# Patient Record
Sex: MALE | Race: OTHER | HISPANIC OR LATINO | URBAN - METROPOLITAN AREA
[De-identification: names, ages, dates, MRNs, and addresses within clinical notes are randomized per-mention and may not be internally consistent; named-entity substitution may affect disease eponyms.]

---

## 2018-06-11 ENCOUNTER — INPATIENT (INPATIENT)
Facility: HOSPITAL | Age: 33
LOS: 0 days | Discharge: HOME | End: 2018-06-12
Attending: SURGERY | Admitting: SURGERY
Payer: OTHER MISCELLANEOUS

## 2018-06-11 VITALS
OXYGEN SATURATION: 96 % | RESPIRATION RATE: 16 BRPM | TEMPERATURE: 98 F | WEIGHT: 265 LBS | DIASTOLIC BLOOD PRESSURE: 95 MMHG | SYSTOLIC BLOOD PRESSURE: 140 MMHG | HEART RATE: 76 BPM

## 2018-06-11 DIAGNOSIS — Z90.49 ACQUIRED ABSENCE OF OTHER SPECIFIED PARTS OF DIGESTIVE TRACT: Chronic | ICD-10-CM

## 2018-06-11 LAB
ALBUMIN SERPL ELPH-MCNC: 3.8 G/DL — SIGNIFICANT CHANGE UP (ref 3.3–5)
ALP SERPL-CCNC: 57 U/L — SIGNIFICANT CHANGE UP (ref 40–120)
ALT FLD-CCNC: 33 U/L — SIGNIFICANT CHANGE UP (ref 12–78)
ANION GAP SERPL CALC-SCNC: 4 MMOL/L — LOW (ref 5–17)
APTT BLD: 29.2 SEC — SIGNIFICANT CHANGE UP (ref 27.5–37.4)
AST SERPL-CCNC: 27 U/L — SIGNIFICANT CHANGE UP (ref 15–37)
BASOPHILS # BLD AUTO: 0.09 K/UL — SIGNIFICANT CHANGE UP (ref 0–0.2)
BASOPHILS NFR BLD AUTO: 0.8 % — SIGNIFICANT CHANGE UP (ref 0–2)
BILIRUB SERPL-MCNC: 0.4 MG/DL — SIGNIFICANT CHANGE UP (ref 0.2–1.2)
BLD GP AB SCN SERPL QL: SIGNIFICANT CHANGE UP
BUN SERPL-MCNC: 14 MG/DL — SIGNIFICANT CHANGE UP (ref 7–23)
CALCIUM SERPL-MCNC: 8.5 MG/DL — SIGNIFICANT CHANGE UP (ref 8.5–10.1)
CHLORIDE SERPL-SCNC: 107 MMOL/L — SIGNIFICANT CHANGE UP (ref 96–108)
CK SERPL-CCNC: 318 U/L — HIGH (ref 26–308)
CO2 SERPL-SCNC: 28 MMOL/L — SIGNIFICANT CHANGE UP (ref 22–31)
CREAT SERPL-MCNC: 1.12 MG/DL — SIGNIFICANT CHANGE UP (ref 0.5–1.3)
EOSINOPHIL # BLD AUTO: 0.3 K/UL — SIGNIFICANT CHANGE UP (ref 0–0.5)
EOSINOPHIL NFR BLD AUTO: 2.8 % — SIGNIFICANT CHANGE UP (ref 0–6)
GLUCOSE SERPL-MCNC: 89 MG/DL — SIGNIFICANT CHANGE UP (ref 70–99)
HCT VFR BLD CALC: 44.5 % — SIGNIFICANT CHANGE UP (ref 39–50)
HGB BLD-MCNC: 15.1 G/DL — SIGNIFICANT CHANGE UP (ref 13–17)
IMM GRANULOCYTES NFR BLD AUTO: 0.7 % — SIGNIFICANT CHANGE UP (ref 0–1.5)
INR BLD: 1.12 RATIO — SIGNIFICANT CHANGE UP (ref 0.88–1.16)
LACTATE SERPL-SCNC: 1.5 MMOL/L — SIGNIFICANT CHANGE UP (ref 0.7–2)
LYMPHOCYTES # BLD AUTO: 2.5 K/UL — SIGNIFICANT CHANGE UP (ref 1–3.3)
LYMPHOCYTES # BLD AUTO: 23.1 % — SIGNIFICANT CHANGE UP (ref 13–44)
MCHC RBC-ENTMCNC: 28.5 PG — SIGNIFICANT CHANGE UP (ref 27–34)
MCHC RBC-ENTMCNC: 33.9 GM/DL — SIGNIFICANT CHANGE UP (ref 32–36)
MCV RBC AUTO: 84.1 FL — SIGNIFICANT CHANGE UP (ref 80–100)
MONOCYTES # BLD AUTO: 0.9 K/UL — SIGNIFICANT CHANGE UP (ref 0–0.9)
MONOCYTES NFR BLD AUTO: 8.3 % — SIGNIFICANT CHANGE UP (ref 2–14)
NEUTROPHILS # BLD AUTO: 6.93 K/UL — SIGNIFICANT CHANGE UP (ref 1.8–7.4)
NEUTROPHILS NFR BLD AUTO: 64.3 % — SIGNIFICANT CHANGE UP (ref 43–77)
NRBC # BLD: 0 /100 WBCS — SIGNIFICANT CHANGE UP (ref 0–0)
PLATELET # BLD AUTO: 230 K/UL — SIGNIFICANT CHANGE UP (ref 150–400)
POTASSIUM SERPL-MCNC: 3.8 MMOL/L — SIGNIFICANT CHANGE UP (ref 3.5–5.3)
POTASSIUM SERPL-SCNC: 3.8 MMOL/L — SIGNIFICANT CHANGE UP (ref 3.5–5.3)
PROT SERPL-MCNC: 7.4 GM/DL — SIGNIFICANT CHANGE UP (ref 6–8.3)
PROTHROM AB SERPL-ACNC: 12.1 SEC — SIGNIFICANT CHANGE UP (ref 9.8–12.7)
RBC # BLD: 5.29 M/UL — SIGNIFICANT CHANGE UP (ref 4.2–5.8)
RBC # FLD: 13.1 % — SIGNIFICANT CHANGE UP (ref 10.3–14.5)
SODIUM SERPL-SCNC: 139 MMOL/L — SIGNIFICANT CHANGE UP (ref 135–145)
TYPE + AB SCN PNL BLD: SIGNIFICANT CHANGE UP
WBC # BLD: 10.8 K/UL — HIGH (ref 3.8–10.5)
WBC # FLD AUTO: 10.8 K/UL — HIGH (ref 3.8–10.5)

## 2018-06-11 PROCEDURE — 73120 X-RAY EXAM OF HAND: CPT | Mod: 26,RT

## 2018-06-11 PROCEDURE — 71045 X-RAY EXAM CHEST 1 VIEW: CPT | Mod: 26

## 2018-06-11 PROCEDURE — 76377 3D RENDER W/INTRP POSTPROCES: CPT | Mod: 26

## 2018-06-11 PROCEDURE — 73090 X-RAY EXAM OF FOREARM: CPT | Mod: 26,RT

## 2018-06-11 PROCEDURE — 73080 X-RAY EXAM OF ELBOW: CPT | Mod: 26,LT

## 2018-06-11 PROCEDURE — 93010 ELECTROCARDIOGRAM REPORT: CPT

## 2018-06-11 PROCEDURE — 73110 X-RAY EXAM OF WRIST: CPT | Mod: 26,RT

## 2018-06-11 PROCEDURE — 74177 CT ABD & PELVIS W/CONTRAST: CPT | Mod: 26

## 2018-06-11 PROCEDURE — 73080 X-RAY EXAM OF ELBOW: CPT | Mod: 26,77,RT

## 2018-06-11 PROCEDURE — 72125 CT NECK SPINE W/O DYE: CPT | Mod: 26

## 2018-06-11 PROCEDURE — 99285 EMERGENCY DEPT VISIT HI MDM: CPT

## 2018-06-11 PROCEDURE — 70450 CT HEAD/BRAIN W/O DYE: CPT | Mod: 26

## 2018-06-11 PROCEDURE — 71260 CT THORAX DX C+: CPT | Mod: 26

## 2018-06-11 PROCEDURE — 73100 X-RAY EXAM OF WRIST: CPT | Mod: 26

## 2018-06-11 RX ORDER — HEPARIN SODIUM 5000 [USP'U]/ML
5000 INJECTION INTRAVENOUS; SUBCUTANEOUS EVERY 8 HOURS
Refills: 0 | Status: DISCONTINUED | OUTPATIENT
Start: 2018-06-11 | End: 2018-06-12

## 2018-06-11 RX ORDER — HYDROMORPHONE HYDROCHLORIDE 2 MG/ML
1 INJECTION INTRAMUSCULAR; INTRAVENOUS; SUBCUTANEOUS ONCE
Refills: 0 | Status: DISCONTINUED | OUTPATIENT
Start: 2018-06-11 | End: 2018-06-11

## 2018-06-11 RX ORDER — FAMOTIDINE 10 MG/ML
20 INJECTION INTRAVENOUS ONCE
Refills: 0 | Status: COMPLETED | OUTPATIENT
Start: 2018-06-11 | End: 2018-06-11

## 2018-06-11 RX ORDER — MORPHINE SULFATE 50 MG/1
4 CAPSULE, EXTENDED RELEASE ORAL
Refills: 0 | Status: DISCONTINUED | OUTPATIENT
Start: 2018-06-11 | End: 2018-06-12

## 2018-06-11 RX ORDER — HYDROMORPHONE HYDROCHLORIDE 2 MG/ML
1 INJECTION INTRAMUSCULAR; INTRAVENOUS; SUBCUTANEOUS EVERY 4 HOURS
Refills: 0 | Status: DISCONTINUED | OUTPATIENT
Start: 2018-06-11 | End: 2018-06-11

## 2018-06-11 RX ORDER — SODIUM CHLORIDE 9 MG/ML
1000 INJECTION INTRAMUSCULAR; INTRAVENOUS; SUBCUTANEOUS
Refills: 0 | Status: DISCONTINUED | OUTPATIENT
Start: 2018-06-11 | End: 2018-06-12

## 2018-06-11 RX ORDER — OXYCODONE HYDROCHLORIDE 5 MG/1
5 TABLET ORAL EVERY 4 HOURS
Refills: 0 | Status: DISCONTINUED | OUTPATIENT
Start: 2018-06-11 | End: 2018-06-12

## 2018-06-11 RX ORDER — ONDANSETRON 8 MG/1
4 TABLET, FILM COATED ORAL EVERY 6 HOURS
Refills: 0 | Status: DISCONTINUED | OUTPATIENT
Start: 2018-06-11 | End: 2018-06-12

## 2018-06-11 RX ORDER — SODIUM CHLORIDE 9 MG/ML
1000 INJECTION INTRAMUSCULAR; INTRAVENOUS; SUBCUTANEOUS ONCE
Refills: 0 | Status: COMPLETED | OUTPATIENT
Start: 2018-06-11 | End: 2018-06-11

## 2018-06-11 RX ORDER — CEFAZOLIN SODIUM 1 G
1000 VIAL (EA) INJECTION ONCE
Refills: 0 | Status: COMPLETED | OUTPATIENT
Start: 2018-06-11 | End: 2018-06-11

## 2018-06-11 RX ORDER — OXYCODONE HYDROCHLORIDE 5 MG/1
10 TABLET ORAL EVERY 4 HOURS
Refills: 0 | Status: DISCONTINUED | OUTPATIENT
Start: 2018-06-11 | End: 2018-06-12

## 2018-06-11 RX ADMIN — Medication 100 MILLIGRAM(S): at 13:51

## 2018-06-11 RX ADMIN — FAMOTIDINE 20 MILLIGRAM(S): 10 INJECTION INTRAVENOUS at 16:10

## 2018-06-11 RX ADMIN — MORPHINE SULFATE 4 MILLIGRAM(S): 50 CAPSULE, EXTENDED RELEASE ORAL at 22:37

## 2018-06-11 RX ADMIN — HYDROMORPHONE HYDROCHLORIDE 1 MILLIGRAM(S): 2 INJECTION INTRAMUSCULAR; INTRAVENOUS; SUBCUTANEOUS at 14:25

## 2018-06-11 RX ADMIN — HYDROMORPHONE HYDROCHLORIDE 1 MILLIGRAM(S): 2 INJECTION INTRAMUSCULAR; INTRAVENOUS; SUBCUTANEOUS at 13:51

## 2018-06-11 RX ADMIN — MORPHINE SULFATE 4 MILLIGRAM(S): 50 CAPSULE, EXTENDED RELEASE ORAL at 22:22

## 2018-06-11 RX ADMIN — SODIUM CHLORIDE 125 MILLILITER(S): 9 INJECTION INTRAMUSCULAR; INTRAVENOUS; SUBCUTANEOUS at 22:22

## 2018-06-11 RX ADMIN — SODIUM CHLORIDE 1000 MILLILITER(S): 9 INJECTION INTRAMUSCULAR; INTRAVENOUS; SUBCUTANEOUS at 13:51

## 2018-06-11 RX ADMIN — HYDROMORPHONE HYDROCHLORIDE 1 MILLIGRAM(S): 2 INJECTION INTRAMUSCULAR; INTRAVENOUS; SUBCUTANEOUS at 19:03

## 2018-06-11 NOTE — CONSULT NOTE ADULT - CONSULT REQUESTED DATE/TIME
Mom states that Edward still has congestive cough. Rescheduled pre-op appt to Monday morning because she knew that he would not be cleared for surgery and she did not want to have to reschedule again. She is hoping that a few more days of rest will help clear Edward's cough. This RN gave mom Ped's Sedation # and informed her to call and notify them if Edward is not cleared for surgery after his appt so they can reschedule his procedure.   
11-Jun-2018 17:05

## 2018-06-11 NOTE — CONSULT NOTE ADULT - SUBJECTIVE AND OBJECTIVE BOX
33y Male RHD presents c/o R wrist pain sp mechanical fall from 8-12ft while descending from ladder.  Patient landed on face, denies LOC. Denies numbness/tingling. Denies fever/chills. C/o pain in bilateral wrists/elbows and right upper extremity.  Denies pain/injury elsewhere. No other complaints.    PAST MEDICAL & SURGICAL HISTORY:  No pertinent past medical history    MEDICATIONS  (STANDING):    Allergies    No Known Allergies    Intolerances                         15.1   10.80 )-----------( 230      ( 11 Jun 2018 13:32 )             44.5     11 Jun 2018 13:32    139    |  107    |  14     ----------------------------<  89     3.8     |  28     |  1.12     Ca    8.5        11 Jun 2018 13:32    TPro  7.4    /  Alb  3.8    /  TBili  0.4    /  DBili  x      /  AST  27     /  ALT  33     /  AlkPhos  57     11 Jun 2018 13:32    PT/INR - ( 11 Jun 2018 13:32 )   PT: 12.1 sec;   INR: 1.12 ratio         PTT - ( 11 Jun 2018 13:32 )  PTT:29.2 sec  Vital Signs Last 24 Hrs  T(C): 36.4 (06-11-18 @ 13:32), Max: 36.4 (06-11-18 @ 13:32)  T(F): 97.6 (06-11-18 @ 13:32), Max: 97.6 (06-11-18 @ 13:32)  HR: 76 (06-11-18 @ 13:32) (76 - 76)  BP: 140/95 (06-11-18 @ 13:32) (140/95 - 140/95)  BP(mean): --  RR: 16 (06-11-18 @ 13:32) (16 - 16)  SpO2: 96% (06-11-18 @ 13:32) (96% - 96%)    Imaging: XR demonstrates right scaphoid fracture  CT demonstrates nasal bone fracture and zygomatic fractures    Physical Exam  Gen: NAD  R UE: Skin intact, small superficial laceration <1cm over anterior proximal forearm, no deformities, +ttp snuffbox, no other bony ttp, +r/u/m/ain/pin function, SILT, radial/ulnar pulse intact, compartments soft/compressible, warm/well perfused  L UE: large superficial abrasion over posterior elbow/forearm, no deformity, no ttp, +r/u/m/ain/pin function, SILT, radial/ulnar pulse intact, compartments soft/compressible, warm/well perfused    Secondary Survey: No TTP over bony prominences, able to SLR bilaterally, SILT, palpable pulses, full/painless range of motion, compartments soft     Procedure: Placed into well padded thumb spica splint on right upper extremity. Post procedure xrays pending. Post procedure exam unchanged, NV intact. Patient tolerated well.     A/P: 33y Male with R scaphoid fracture  FU Post-splint xrays  Pain control  NWB R UE in splint, keep c/d/I, sling for comfort  Ice/elevation   Active movement of fingers encouraged  Si/Sx compartment syndrome discussed with patient  Possible need for surgical intervention in future discussed with patient  All question answered  Follow up with Dr. Nelson as outpatient within 1 week, call office for appointment

## 2018-06-11 NOTE — ED PROVIDER NOTE - OBJECTIVE STATEMENT
32 y/o male with no relevant PMHx presents to the ED BIBA s/p mechanical fall from a 12-foot ladder at work [employer].  Pt now c/o right arm pain, right facial pain. +puncture would to RUE, abrasion to right face and LUE. Denies LOC, abd pain. Pt presents with a splint in place on his RUE.

## 2018-06-11 NOTE — ED ADULT NURSE NOTE - CHPI ED SYMPTOMS NEG
no tingling/no weakness/no confusion/no deformity/no numbness/no vomiting/no bleeding/no loss of consciousness/no fever

## 2018-06-11 NOTE — ED ADULT TRIAGE NOTE - CHIEF COMPLAINT QUOTE
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 Pt arrived with splint to right arm for immobilization, swelling to right side of face, left arm abrasion, stable VS, alert and oriented. Medic stated puncture wound to right elbow area and applied sterile gauze.

## 2018-06-11 NOTE — H&P ADULT - NSHPLABSRESULTS_GEN_ALL_CORE
15.1   10.80 )-----------( 230      ( 11 Jun 2018 13:32 )             44.5     11 Jun 2018 13:32    139    |  107    |  14     ----------------------------<  89     3.8     |  28     |  1.12     Ca    8.5        11 Jun 2018 13:32    TPro  7.4    /  Alb  3.8    /  TBili  0.4    /  DBili  x      /  AST  27     /  ALT  33     /  AlkPhos  57     11 Jun 2018 13:32    PT/INR - ( 11 Jun 2018 13:32 )   PT: 12.1 sec;   INR: 1.12 ratio         PTT - ( 11 Jun 2018 13:32 )  PTT:29.2 sec    Radiology:   Left wrist x-ray: No fracture  RT wrist x ray: Scaphoid fracture  Rt fore arm x ray: No fractures  Ct head: no ICH, fracture  CT C spine: No fracture,   CT chest: No injury  CT abdomen and pelvis: No injury  CT Max facial : left nasal bone, rt zygomatic  bone fracture, mucosal PNS thickening.

## 2018-06-11 NOTE — ED PROVIDER NOTE - PROGRESS NOTE DETAILS
CT with right zygomatic arch fx, otherwise no intra-abdominal or thoracic injury, CT head/cspine unremarkable.  XR with scaphoid fx, being eval by ortho  To be admitted to trauma surgery (Imtiaz). D/W ortho/Dagly - concerns re impending compartment syndrome given swelling right forearm, limited ROM/sensation right hand. Spoke with Dr. Diaz.  Will see patient as inpatient.  Petey Calles DO.

## 2018-06-11 NOTE — ED ADULT NURSE NOTE - OBJECTIVE STATEMENT
rcvd pt as trauma alert; pt fell 10ft from ladder, c/o right/left  wrist pain, no signs of deformities, pain to left upper arm noted abrasion, right temporal pain.

## 2018-06-11 NOTE — ED PROVIDER NOTE - MUSCULOSKELETAL, MLM
No C-spine tenderness. Moderate swelling of b/l elbow, wrist, and forearm. Pelvis stable. MAEx4. Back: atraumatic.

## 2018-06-11 NOTE — ED PROVIDER NOTE - SKIN, MLM
Skin normal color for race, warm, and dry. No evidence of rash. +Abrasion right face, small puncture wound to RUE. LUE abrasion.

## 2018-06-11 NOTE — H&P ADULT - NSHPPHYSICALEXAM_GEN_ALL_CORE
T(C): 36.4 (06-11-18 @ 13:32), Max: 36.4 (06-11-18 @ 13:32)  T(F): 97.6 (06-11-18 @ 13:32), Max: 97.6 (06-11-18 @ 13:32)  HR: 76 (06-11-18 @ 13:32) (76 - 76)  BP: 140/95 (06-11-18 @ 13:32) (140/95 - 140/95)  BP(mean): --  RR: 16 (06-11-18 @ 13:32) (16 - 16)  SpO2: 96% (06-11-18 @ 13:32) (96% - 96%)    PHYSICAL EXAM:  Constitutional: NAD, GCS: 15/15  Eyes:  WNL  ENMT:  tenderness, nasal bridge, rt zygomatic area. No nasal bleeding.  Neck:  WNL, non tender  Back: Non tender  Respiratory: CTABL  Cardiovascular:  S1+S2+0  Gastrointestinal: Soft, ND , NT  Genitourinary:  WNL  Extremities: NV intact, Rt arm and forearm swelling with two areas of superficial laceration, sensations intact, motor function limited due to pain, radial and ulnar pulse palpable but weak.  Vascular:  Intact  Neurological: No focal neurological deficit,  CN, motor and sensory system grossly intact.  Skin: WNL  Musculoskeletal: WNL  Psychiatric: Grossly WNL

## 2018-06-11 NOTE — H&P ADULT - ASSESSMENT
33 Y old male S/p fall, Rt scaphoid Fracture, Rt fore arm soft tissue swelling, Rt zygomatic bone fracture, nasal bone fracture.    Pain control  Tetanus toxoid  DVT /GI prophylaxis  Orthopedic consulted,  Serial vascular checks, watch for compartment syndrome  Check CK, lactate  IV hydration  left arm elevation  Plastic surgery consult  PT once cleared by ortho  Keep on clear liquid diet while watching for compartment syndrome

## 2018-06-11 NOTE — H&P ADULT - HISTORY OF PRESENT ILLNESS
33 Y old male fell off a 10 feet ladder when the ladder slid down, ABC intact in ER, C/o left arm, and wrist pain. he hit his head but no LOC,no neck pain. No SOB, no chest pain. No abdominal pain. Moves all extremities no focal neurological complaint. No back pain.

## 2018-06-11 NOTE — CHART NOTE - NSCHARTNOTEFT_GEN_A_CORE
Patient seen and examined at bedside, pain controlled and improving  Compartments soft/compressible in bilateral upper and lower extremities  No paresthesias/numbness/tingling  No pain with passive stretch  Palpable pulses bilaterally  SILT C5-T1, L3-S1  Splint loosened  Maintain RUE ice/elevation, NWB in splint

## 2018-06-11 NOTE — ED PROVIDER NOTE - MEDICAL DECISION MAKING DETAILS
34 y/o male s/p fall. Plan for CT, x-ray extremities, ortho consult, trauma consult, labs, pain control, abx.

## 2018-06-11 NOTE — ED PROVIDER NOTE - CONSTITUTIONAL, MLM
normal... Well nourished, awake, alert, oriented to person, place, time/situation. +Moderate distress

## 2018-06-11 NOTE — ED PROVIDER NOTE - CARE PLAN
Principal Discharge DX:	Accidental fall from ladder, initial encounter  Secondary Diagnosis:	Scaphoid fracture, wrist, closed

## 2018-06-12 ENCOUNTER — TRANSCRIPTION ENCOUNTER (OUTPATIENT)
Age: 33
End: 2018-06-12

## 2018-06-12 VITALS
SYSTOLIC BLOOD PRESSURE: 155 MMHG | RESPIRATION RATE: 16 BRPM | DIASTOLIC BLOOD PRESSURE: 62 MMHG | OXYGEN SATURATION: 97 % | TEMPERATURE: 98 F | HEART RATE: 90 BPM
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
## 2018-06-12 DIAGNOSIS — S62.009P: ICD-10-CM

## 2018-06-12 LAB
APPEARANCE UR: CLEAR — SIGNIFICANT CHANGE UP
BILIRUB UR-MCNC: NEGATIVE — SIGNIFICANT CHANGE UP
COLOR SPEC: YELLOW — SIGNIFICANT CHANGE UP
DIFF PNL FLD: NEGATIVE — SIGNIFICANT CHANGE UP
GLUCOSE UR QL: NEGATIVE MG/DL — SIGNIFICANT CHANGE UP
KETONES UR-MCNC: NEGATIVE — SIGNIFICANT CHANGE UP
LEUKOCYTE ESTERASE UR-ACNC: NEGATIVE — SIGNIFICANT CHANGE UP
NITRITE UR-MCNC: NEGATIVE — SIGNIFICANT CHANGE UP
PH UR: 5 — SIGNIFICANT CHANGE UP (ref 5–8)
PROT UR-MCNC: NEGATIVE MG/DL — SIGNIFICANT CHANGE UP
SP GR SPEC: 1.02 — SIGNIFICANT CHANGE UP (ref 1.01–1.02)
UROBILINOGEN FLD QL: NEGATIVE MG/DL — SIGNIFICANT CHANGE UP

## 2018-06-12 PROCEDURE — 99238 HOSP IP/OBS DSCHRG MGMT 30/<: CPT

## 2018-06-12 RX ORDER — OXYCODONE HYDROCHLORIDE 5 MG/1
1 TABLET ORAL
Qty: 20 | Refills: 0
Start: 2018-06-12 | End: 2018-06-16

## 2018-06-12 RX ORDER — ACETAMINOPHEN 500 MG
650 TABLET ORAL EVERY 6 HOURS
Refills: 0 | Status: DISCONTINUED | OUTPATIENT
Start: 2018-06-12 | End: 2018-06-12

## 2018-06-12 RX ORDER — ACETAMINOPHEN 500 MG
2 TABLET ORAL
Qty: 0 | Refills: 0 | DISCHARGE
Start: 2018-06-12

## 2018-06-12 RX ADMIN — MORPHINE SULFATE 4 MILLIGRAM(S): 50 CAPSULE, EXTENDED RELEASE ORAL at 08:05

## 2018-06-12 RX ADMIN — MORPHINE SULFATE 4 MILLIGRAM(S): 50 CAPSULE, EXTENDED RELEASE ORAL at 01:33

## 2018-06-12 RX ADMIN — MORPHINE SULFATE 4 MILLIGRAM(S): 50 CAPSULE, EXTENDED RELEASE ORAL at 06:47

## 2018-06-12 RX ADMIN — HYDROMORPHONE HYDROCHLORIDE 1 MILLIGRAM(S): 2 INJECTION INTRAMUSCULAR; INTRAVENOUS; SUBCUTANEOUS at 08:05

## 2018-06-12 RX ADMIN — Medication 650 MILLIGRAM(S): at 12:23

## 2018-06-12 RX ADMIN — MORPHINE SULFATE 4 MILLIGRAM(S): 50 CAPSULE, EXTENDED RELEASE ORAL at 01:48

## 2018-06-12 RX ADMIN — Medication 650 MILLIGRAM(S): at 14:52

## 2018-06-12 RX ADMIN — Medication 650 MILLIGRAM(S): at 08:26

## 2018-06-12 RX ADMIN — OXYCODONE HYDROCHLORIDE 5 MILLIGRAM(S): 5 TABLET ORAL at 15:02

## 2018-06-12 NOTE — PROGRESS NOTE ADULT - SUBJECTIVE AND OBJECTIVE BOX
33 Y old male fell off a 10 feet ladder when the ladder slid down, ABC intact in ER, C/o left arm, and wrist pain. he hit his head but no LOC,no neck pain. No SOB, no chest pain. No abdominal pain. Moves all extremities no focal neurological complaint. No back pain.    6/12 NO acute changes, complications.    Vital Signs Last 24 Hrs  T(C): 36.8 (12 Jun 2018 11:20), Max: 36.9 (11 Jun 2018 20:05)  T(F): 98.3 (12 Jun 2018 11:20), Max: 98.4 (11 Jun 2018 20:05)  HR: 90 (12 Jun 2018 11:20) (68 - 90)  BP: 155/62 (12 Jun 2018 11:20) (117/64 - 155/62)  BP(mean): --  RR: 16 (12 Jun 2018 11:20) (16 - 18)  SpO2: 97% (12 Jun 2018 11:20) (97% - 99%)    ROS- negative other than above.

## 2018-06-12 NOTE — DISCHARGE NOTE ADULT - CARE PROVIDER_API CALL
Chao Nelson), Orthopaedic Surgery; Surgery of the Hand  [address] [place] [address]  Phone: [phone]  Fax: [phone]

## 2018-06-12 NOTE — PROGRESS NOTE ADULT - SUBJECTIVE AND OBJECTIVE BOX
Pt S/E at bedside, no acute events overnight, pain controlled, standing/ambulating in room this morning without pain    Vital Signs Last 24 Hrs  T(C): 36.7 (11 Jun 2018 23:40), Max: 36.9 (11 Jun 2018 20:05)  T(F): 98.1 (11 Jun 2018 23:40), Max: 98.4 (11 Jun 2018 20:05)  HR: 71 (11 Jun 2018 23:40) (71 - 76)  BP: 117/64 (11 Jun 2018 23:40) (117/64 - 140/95)  BP(mean): --  RR: 18 (11 Jun 2018 23:40) (16 - 18)  SpO2: 98% (11 Jun 2018 23:40) (96% - 98%)    Gen: NAD, AAOx3    Right Upper Extremity:  Splint clean dry intact  Wiggles fingers  SILT C5-T1  Fingers warm/well perfused  Compartments soft, no pain with passive stretch  No calf TTP B/L

## 2018-06-12 NOTE — DISCHARGE NOTE ADULT - SECONDARY DIAGNOSIS.
Accidental fall from ladder, initial encounter Bipolar affective disorder, remission status unspecified

## 2018-06-12 NOTE — DISCHARGE NOTE ADULT - PATIENT PORTAL LINK FT
You can access the [facility] Patient [facility], offered by [facility], by registering with the following website: [email or web address]

## 2018-06-12 NOTE — PROVIDER CONTACT NOTE (OTHER) - SITUATION
pt was transferred from the ED and complained of right posterior thigh pain in addition to his wrist pain

## 2018-06-12 NOTE — PROGRESS NOTE ADULT - ASSESSMENT
A/P: 33M with R scaphoid fracture  Analgesia  DVT ppx  NWB RUE in splint  PT/OT  FU Labs  No acute orthopedic intervention  Can follow up with Dr. Nelson within 1 week after discharge from hospital, Call office for appointment  Ortho stable

## 2018-06-12 NOTE — DISCHARGE NOTE ADULT - PRINCIPAL DIAGNOSIS
Closed nondisplaced fracture of scaphoid with malunion, unspecified laterality, unspecified portion of scaphoid, subsequent encounter

## 2018-06-12 NOTE — DISCHARGE NOTE ADULT - MEDICATION SUMMARY - MEDICATIONS TO TAKE
I will START or STAY ON the medications listed below when I get home from the hospital:    acetaminophen 325 mg oral tablet  -- 2 tab(s) by mouth every 6 hours, As needed, Moderate Pain (4 - 6)  -- Indication: For Accidental fall from ladder, initial encounter    oxyCODONE 5 mg oral tablet  -- 1 tab(s) by mouth every 6 hours MDD:4  -- Caution federal law prohibits the transfer of this drug to any person other  than the person for whom it was prescribed.  It is very important that you take or use this exactly as directed.  Do not skip doses or discontinue unless directed by your doctor.  May cause drowsiness.  Alcohol may intensify this effect.  Use care when operating dangerous machinery.  This prescription cannot be refilled.  Using more of this medication than prescribed may cause serious breathing problems.    -- Indication: For Scaphoid fracture, wrist, closed

## 2018-06-12 NOTE — DISCHARGE NOTE ADULT - CARE PLAN
Principal Discharge DX:	Closed nondisplaced fracture of scaphoid with malunion, unspecified laterality, unspecified portion of scaphoid, subsequent encounter  Goal:	healing  Assessment and plan of treatment:	elevation  Secondary Diagnosis:	Accidental fall from ladder, initial encounter  Secondary Diagnosis:	Bipolar affective disorder, remission status unspecified

## 2018-06-12 NOTE — PHYSICAL THERAPY INITIAL EVALUATION ADULT - PERTINENT HX OF CURRENT PROBLEM, REHAB EVAL
33y Male RHD presents c/o R wrist pain sp mechanical fall from 8-12ft while descending from ladder.  Patient landed on face, denies LOC. Denies numbness/tingling. Denies fever/chills. C/o pain in bilateral wrists/elbows and right upper extremity XR demonstrates right scaphoid fracture.

## 2018-06-15 DIAGNOSIS — S02.2XXA FRACTURE OF NASAL BONES, INITIAL ENCOUNTER FOR CLOSED FRACTURE: ICD-10-CM

## 2018-06-15 DIAGNOSIS — F31.9 BIPOLAR DISORDER, UNSPECIFIED: ICD-10-CM

## 2018-06-15 DIAGNOSIS — S02.40EA ZYGOMATIC FRACTURE, RIGHT SIDE, INITIAL ENCOUNTER FOR CLOSED FRACTURE: ICD-10-CM

## 2018-06-15 DIAGNOSIS — S41.131A PUNCTURE WOUND WITHOUT FOREIGN BODY OF RIGHT UPPER ARM, INITIAL ENCOUNTER: ICD-10-CM

## 2018-06-15 DIAGNOSIS — S00.81XA ABRASION OF OTHER PART OF HEAD, INITIAL ENCOUNTER: ICD-10-CM
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
## 2018-06-15 DIAGNOSIS — S50.312A ABRASION OF LEFT ELBOW, INITIAL ENCOUNTER: ICD-10-CM

## 2018-06-15 DIAGNOSIS — S62.001A UNSPECIFIED FRACTURE OF NAVICULAR [SCAPHOID] BONE OF RIGHT WRIST, INITIAL ENCOUNTER FOR CLOSED FRACTURE: ICD-10-CM
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
## 2018-06-15 DIAGNOSIS — M25.531 PAIN IN RIGHT WRIST: ICD-10-CM

## 2018-06-15 DIAGNOSIS — S50.812A ABRASION OF LEFT FOREARM, INITIAL ENCOUNTER: ICD-10-CM

## 2018-06-18 ENCOUNTER — APPOINTMENT (OUTPATIENT)
Dept: SURGERY | Facility: CLINIC | Age: 33
End: 2018-06-18
Payer: OTHER MISCELLANEOUS

## 2018-06-18 DIAGNOSIS — S62.101D FRACTURE OF UNSPECIFIED CARPAL BONE, RIGHT WRIST, SUBSEQUENT ENCOUNTER FOR FRACTURE WITH ROUTINE HEALING: ICD-10-CM

## 2018-06-18 DIAGNOSIS — S02.402A ZYGOMATIC FRACTURE, UNSPECIFIED SIDE, INITIAL ENCOUNTER FOR CLOSED FRACTURE: ICD-10-CM

## 2018-06-18 DIAGNOSIS — S02.2XXS FRACTURE OF NASAL BONES, SEQUELA: ICD-10-CM

## 2018-06-18 PROCEDURE — XXXXX: CPT

## 2019-05-13 ENCOUNTER — EMERGENCY (EMERGENCY)
Facility: HOSPITAL | Age: 34
LOS: 1 days | Discharge: HOME | End: 2019-05-13
Attending: EMERGENCY MEDICINE
Payer: MEDICAID

## 2019-05-13 VITALS
RESPIRATION RATE: 20 BRPM | DIASTOLIC BLOOD PRESSURE: 87 MMHG | TEMPERATURE: 98 F | HEIGHT: 70 IN | HEART RATE: 73 BPM | OXYGEN SATURATION: 97 % | WEIGHT: 255.07 LBS | SYSTOLIC BLOOD PRESSURE: 138 MMHG

## 2019-05-13 DIAGNOSIS — Z90.49 ACQUIRED ABSENCE OF OTHER SPECIFIED PARTS OF DIGESTIVE TRACT: Chronic | ICD-10-CM

## 2019-05-13 PROCEDURE — 93010 ELECTROCARDIOGRAM REPORT: CPT

## 2019-05-13 PROCEDURE — 99284 EMERGENCY DEPT VISIT MOD MDM: CPT | Mod: 25

## 2019-05-14 VITALS
OXYGEN SATURATION: 97 % | SYSTOLIC BLOOD PRESSURE: 134 MMHG | RESPIRATION RATE: 20 BRPM | DIASTOLIC BLOOD PRESSURE: 64 MMHG | HEART RATE: 88 BPM

## 2019-05-14 LAB
ALBUMIN SERPL ELPH-MCNC: 4.2 G/DL — SIGNIFICANT CHANGE UP (ref 3.3–5.2)
ALP SERPL-CCNC: 75 U/L — SIGNIFICANT CHANGE UP (ref 40–120)
ALT FLD-CCNC: 25 U/L — SIGNIFICANT CHANGE UP
ANION GAP SERPL CALC-SCNC: 13 MMOL/L — SIGNIFICANT CHANGE UP (ref 5–17)
AST SERPL-CCNC: 33 U/L — SIGNIFICANT CHANGE UP
BASOPHILS # BLD AUTO: 0 K/UL — SIGNIFICANT CHANGE UP (ref 0–0.2)
BASOPHILS NFR BLD AUTO: 0.5 % — SIGNIFICANT CHANGE UP (ref 0–2)
BILIRUB SERPL-MCNC: <0.2 MG/DL — LOW (ref 0.4–2)
BUN SERPL-MCNC: 18 MG/DL — SIGNIFICANT CHANGE UP (ref 8–20)
CALCIUM SERPL-MCNC: 9.2 MG/DL — SIGNIFICANT CHANGE UP (ref 8.6–10.2)
CHLORIDE SERPL-SCNC: 102 MMOL/L — SIGNIFICANT CHANGE UP (ref 98–107)
CO2 SERPL-SCNC: 21 MMOL/L — LOW (ref 22–29)
CREAT SERPL-MCNC: 0.92 MG/DL — SIGNIFICANT CHANGE UP (ref 0.5–1.3)
EOSINOPHIL # BLD AUTO: 0.4 K/UL — SIGNIFICANT CHANGE UP (ref 0–0.5)
EOSINOPHIL NFR BLD AUTO: 3.5 % — SIGNIFICANT CHANGE UP (ref 0–5)
GLUCOSE SERPL-MCNC: 123 MG/DL — HIGH (ref 70–115)
HCT VFR BLD CALC: 46.4 % — SIGNIFICANT CHANGE UP (ref 42–52)
HGB BLD-MCNC: 15.7 G/DL — SIGNIFICANT CHANGE UP (ref 14–18)
LIDOCAIN IGE QN: 35 U/L — SIGNIFICANT CHANGE UP (ref 22–51)
LYMPHOCYTES # BLD AUTO: 2.4 K/UL — SIGNIFICANT CHANGE UP (ref 1–4.8)
LYMPHOCYTES # BLD AUTO: 22.3 % — SIGNIFICANT CHANGE UP (ref 20–55)
MCHC RBC-ENTMCNC: 28.8 PG — SIGNIFICANT CHANGE UP (ref 27–31)
MCHC RBC-ENTMCNC: 33.8 G/DL — SIGNIFICANT CHANGE UP (ref 32–36)
MCV RBC AUTO: 85 FL — SIGNIFICANT CHANGE UP (ref 80–94)
MONOCYTES # BLD AUTO: 1 K/UL — HIGH (ref 0–0.8)
MONOCYTES NFR BLD AUTO: 9.2 % — SIGNIFICANT CHANGE UP (ref 3–10)
NEUTROPHILS # BLD AUTO: 6.9 K/UL — SIGNIFICANT CHANGE UP (ref 1.8–8)
NEUTROPHILS NFR BLD AUTO: 64 % — SIGNIFICANT CHANGE UP (ref 37–73)
PLATELET # BLD AUTO: 261 K/UL — SIGNIFICANT CHANGE UP (ref 150–400)
POTASSIUM SERPL-MCNC: 4.7 MMOL/L — SIGNIFICANT CHANGE UP (ref 3.5–5.3)
POTASSIUM SERPL-SCNC: 4.7 MMOL/L — SIGNIFICANT CHANGE UP (ref 3.5–5.3)
PROT SERPL-MCNC: 7.1 G/DL — SIGNIFICANT CHANGE UP (ref 6.6–8.7)
RBC # BLD: 5.46 M/UL — SIGNIFICANT CHANGE UP (ref 4.6–6.2)
RBC # FLD: 13.9 % — SIGNIFICANT CHANGE UP (ref 11–15.6)
SODIUM SERPL-SCNC: 136 MMOL/L — SIGNIFICANT CHANGE UP (ref 135–145)
TROPONIN T SERPL-MCNC: <0.01 NG/ML — SIGNIFICANT CHANGE UP (ref 0–0.06)
WBC # BLD: 10.8 K/UL — SIGNIFICANT CHANGE UP (ref 4.8–10.8)
WBC # FLD AUTO: 10.8 K/UL — SIGNIFICANT CHANGE UP (ref 4.8–10.8)

## 2019-05-14 PROCEDURE — 83690 ASSAY OF LIPASE: CPT

## 2019-05-14 PROCEDURE — 76705 ECHO EXAM OF ABDOMEN: CPT | Mod: 26

## 2019-05-14 PROCEDURE — 71045 X-RAY EXAM CHEST 1 VIEW: CPT | Mod: 26

## 2019-05-14 PROCEDURE — 71045 X-RAY EXAM CHEST 1 VIEW: CPT

## 2019-05-14 PROCEDURE — 36415 COLL VENOUS BLD VENIPUNCTURE: CPT

## 2019-05-14 PROCEDURE — 85027 COMPLETE CBC AUTOMATED: CPT

## 2019-05-14 PROCEDURE — 93005 ELECTROCARDIOGRAM TRACING: CPT

## 2019-05-14 PROCEDURE — 80053 COMPREHEN METABOLIC PANEL: CPT

## 2019-05-14 PROCEDURE — 76705 ECHO EXAM OF ABDOMEN: CPT

## 2019-05-14 PROCEDURE — 84484 ASSAY OF TROPONIN QUANT: CPT

## 2019-05-14 PROCEDURE — 99284 EMERGENCY DEPT VISIT MOD MDM: CPT | Mod: 25

## 2019-05-14 RX ORDER — FAMOTIDINE 10 MG/ML
20 INJECTION INTRAVENOUS ONCE
Refills: 0 | Status: COMPLETED | OUTPATIENT
Start: 2019-05-14 | End: 2019-05-14

## 2019-05-14 RX ADMIN — FAMOTIDINE 20 MILLIGRAM(S): 10 INJECTION INTRAVENOUS at 00:46

## 2019-05-14 NOTE — ED PROVIDER NOTE - PHYSICAL EXAMINATION
Const: Awake, alert and oriented. In no acute distress. Well appearing.  HEENT: NC/AT. Moist mucous membranes.  Eyes: No scleral icterus. EOMI.  Neck:. Soft and supple. Full ROM without pain.  Cardiac: +S1/S2. No murmurs. Peripheral pulses 2+ and symmetric. No LE edema.  Resp: Speaking in full sentences. No evidence of respiratory distress. No wheezes, rales or rhonchi.  Abd: Soft, RUQ tenderness on palpation, non-distended. Normal bowel sounds in all 4 quadrants. No guarding or rebound.  MSK: FROM in all extremities, no calf tenderness on palpation   Back: Spine midline and non-tender. No CVAT.  Skin: No rashes, abrasions or lacerations.  Lymph: No cervical lymphadenopathy.  Neuro: Awake, alert & oriented x 3. Moves all extremities symmetrically.

## 2019-05-14 NOTE — ED PROVIDER NOTE - OBJECTIVE STATEMENT
Patient is a 35 y/o male with no pmhx presenting for evaluation. Patient stating he ate dinner tonight, fifteen minutes later started experiencing pain in the chest, as well as upper abdomen radiating into his right side of his back. Patient denies experiencing the pain before in the past. Patient denies cardiac hx. Patient denies recent travel or surgeries. Patient denies SOB, palpitations, leg swelling or calf pain, fevers, recent URI, nausesa, vomiting, diarrhea, dysuria. Patient denies smoking or alcohol use

## 2019-05-14 NOTE — ED ADULT NURSE NOTE - OBJECTIVE STATEMENT
pt lying in bed comfortably. pt is alert and oriented x 3. pt c/o of midsternal chest pain beginning at 6pm this evening radiating to mid back worsening when lying flat. as per pt chest pain get better when sitting upright.

## 2019-05-14 NOTE — ED PROVIDER NOTE - PROGRESS NOTE DETAILS
reviewed ekg, chest x-ray, ultrasound of gallbladder, lab work, pt informed to follow up with pmd, pt explained results and d/c instructions, pepcid as needed

## 2019-11-04 NOTE — ED ADULT NURSE NOTE - CAS EDN INTEG ASSESS
CYSTOSCOPY NOTE      PRE-OPERATIVE DIAGNOSIS   Bladder cancer    POST-OPERATIVE DIAGNOSIS  Same no evidence of recurrence    PROCEDURE            Cystoscopy     SURGEON               Doroteo Moreno MD    ANESTHESIA              Local    BLOOD LOSS            None or minimal    SPECIMEN(S)  None    INDICATIONS  The patient presents for flexible cystoscopy. The risks, benefits, and alternatives to the procedure have been explained. The specific risks of bleeding and infection have been discussed. The patient's questions have been answered, and the patient agrees to the proposed procedure.     DESCRIPTION OF PROCEDURE  The patient was taken to the operating suite.  Time out was taken. The patient was prepped and draped in the usual sterile fashion. The urethra was anesthetized using 2% lidocaine jelly local. The lower urinary tract was examined using the flexible cystoscope. The urethra, bladder and ureteral orifices were carefully inspected.     CYSTOSCOPY FINDINGS  Urethra: within normal limits  Prostate:  Open bladder neck from prior Urolift with some residual apical obstructing tissue  Ureteral Orifices: normal in position and number    Bladder:  Moderate trabeculation No stone, tumor or foreign body identified    WDL

## 2020-01-21 ENCOUNTER — EMERGENCY (EMERGENCY)
Facility: HOSPITAL | Age: 35
LOS: 1 days | Discharge: HOME | End: 2020-01-21
Attending: EMERGENCY MEDICINE
Payer: COMMERCIAL

## 2020-01-21 VITALS
TEMPERATURE: 102 F | HEIGHT: 70 IN | SYSTOLIC BLOOD PRESSURE: 126 MMHG | RESPIRATION RATE: 20 BRPM | OXYGEN SATURATION: 96 % | DIASTOLIC BLOOD PRESSURE: 79 MMHG | HEART RATE: 100 BPM | WEIGHT: 270.07 LBS

## 2020-01-21 DIAGNOSIS — Z90.49 ACQUIRED ABSENCE OF OTHER SPECIFIED PARTS OF DIGESTIVE TRACT: Chronic | ICD-10-CM

## 2020-01-21 PROBLEM — E03.9 HYPOTHYROIDISM, UNSPECIFIED: Chronic | Status: ACTIVE | Noted: 2018-06-12

## 2020-01-21 PROBLEM — F31.9 BIPOLAR DISORDER, UNSPECIFIED: Chronic | Status: ACTIVE | Noted: 2018-06-12

## 2020-01-21 PROCEDURE — 71046 X-RAY EXAM CHEST 2 VIEWS: CPT | Mod: 26

## 2020-01-21 PROCEDURE — 99283 EMERGENCY DEPT VISIT LOW MDM: CPT

## 2020-01-21 PROCEDURE — 99283 EMERGENCY DEPT VISIT LOW MDM: CPT | Mod: 25

## 2020-01-21 PROCEDURE — 71046 X-RAY EXAM CHEST 2 VIEWS: CPT

## 2020-01-21 RX ORDER — ACETAMINOPHEN 500 MG
650 TABLET ORAL ONCE
Refills: 0 | Status: COMPLETED | OUTPATIENT
Start: 2020-01-21 | End: 2020-01-21

## 2020-01-21 RX ORDER — BROMPHENIRAMINE MALEATE, DEXTROMETHORPHAN HYDROBROMIDE, PHENYLEPHRINE HYDROCHLORIDE 1; 5; 2.5 MG/5ML; MG/5ML; MG/5ML
20 LIQUID ORAL
Qty: 120 | Refills: 0
Start: 2020-01-21

## 2020-01-21 RX ADMIN — Medication 100 MILLIGRAM(S): at 10:49

## 2020-01-21 RX ADMIN — Medication 650 MILLIGRAM(S): at 10:49

## 2020-01-21 NOTE — ED STATDOCS - PATIENT PORTAL LINK FT
You can access the FollowMyHealth Patient Portal offered by [facility] by registering at the following website: [email or web address]. By joining [facility]’s FollowMyHealth portal, you will also be able to view your health information using other applications (apps) compatible with our system.

## 2020-01-21 NOTE — ED STATDOCS - OBJECTIVE STATEMENT
sore thraoat, dry cough, body achesand fever since saturday.   Coughing fits with subsequent SOB.  taking theraflu with limited releif. No h/o asthma. No smoking. No PMD. No annual flu shot

## 2020-01-21 NOTE — ED STATDOCS - PLAN OF CARE
Tylenol extra strength 2 tablets every 4 hours or Ibuprofen (motrin or advil) 3 tablets (total 600mg) every 6 hours for aches, pains, fevers or chills.  Dimetapp DM 20ml bedfore bedtime for cough/ congestion relief. Keep well hydrated: drink lots of fluids including orange juice, tea with honey/ lemon sophy, and chicken broth. Rest.  Return immediately to the ER for re-examination if your symptoms are worsening. Otherwise, follow-up with your doctor in 2-3 days for re-evaluation.

## 2020-01-21 NOTE — ED STATDOCS - CARE PLAN
Principal Discharge DX:	Influenza-like illness  Assessment and plan of treatment:	Tylenol extra strength 2 tablets every 4 hours or Ibuprofen (motrin or advil) 3 tablets (total 600mg) every 6 hours for aches, pains, fevers or chills.  Dimetapp DM 20ml bedfore bedtime for cough/ congestion relief. Keep well hydrated: drink lots of fluids including orange juice, tea with honey/ lemon sophy, and chicken broth. Rest.  Return immediately to the ER for re-examination if your symptoms are worsening. Otherwise, follow-up with your doctor in 2-3 days for re-evaluation.

## 2020-01-21 NOTE — ED STATDOCS - PMH
Bipolar affective disorder, remission status unspecified    Hypothyroidism, unspecified type    No pertinent past medical history

## 2022-03-18 NOTE — H&P ADULT - NSHPREVIEWOFSYSTEMS_GEN_ALL_CORE
ROS:.  [X] A ten-point review of systems was otherwise negative except as noted.  Systemic:	[ ] Fever	[ ] Chills	[ ] Night sweats    [ ] Fatigue	[ ] Other  [] Cardiovascular:  [] Pulmonary:  [] Renal/Urologic:  [] Gastrointestinal: abdominal pain, vomiting  [] Metabolic:  [] Neurologic:  [] Hematologic:  [] ENT:  [] Ophthalmologic:  [] Musculoskeletal:    [ ] Due to altered mental status/intubation, subjective information were not able to be obtained from the patient. History was obtained, to the extent possible, from review of the chart and collateral sources of information.
Admission

## 2022-08-24 NOTE — ED ADULT TRIAGE NOTE - AS O2 DELIVERY
room air Cyclophosphamide Counseling:  I discussed with the patient the risks of cyclophosphamide including but not limited to hair loss, hormonal abnormalities, decreased fertility, abdominal pain, diarrhea, nausea and vomiting, bone marrow suppression and infection. The patient understands that monitoring is required while taking this medication.

## 2024-12-28 NOTE — ED ADULT NURSE NOTE - NS ED PATIENT SAFETY CONCERN
Be sure to followup as an outpatient for CT imaging of your chest to further evaluate the 8mm nodule noted in the right lower lung noted on xray today   No